# Patient Record
Sex: MALE | Race: ASIAN | Employment: OTHER | URBAN - METROPOLITAN AREA
[De-identification: names, ages, dates, MRNs, and addresses within clinical notes are randomized per-mention and may not be internally consistent; named-entity substitution may affect disease eponyms.]

---

## 2021-05-17 ENCOUNTER — HOSPITAL ENCOUNTER (EMERGENCY)
Facility: HOSPITAL | Age: 86
Discharge: HOME | End: 2021-05-17
Attending: EMERGENCY MEDICINE
Payer: MEDICARE

## 2021-05-17 ENCOUNTER — APPOINTMENT (OUTPATIENT)
Dept: CT IMAGING | Facility: HOSPITAL | Age: 86
End: 2021-05-17
Attending: EMERGENCY MEDICINE
Payer: MEDICARE

## 2021-05-17 VITALS
RESPIRATION RATE: 19 BRPM | WEIGHT: 130 LBS | SYSTOLIC BLOOD PRESSURE: 138 MMHG | BODY MASS INDEX: 20.4 KG/M2 | OXYGEN SATURATION: 99 % | HEART RATE: 61 BPM | TEMPERATURE: 98 F | DIASTOLIC BLOOD PRESSURE: 87 MMHG | HEIGHT: 67 IN

## 2021-05-17 DIAGNOSIS — S09.90XA INJURY OF HEAD, INITIAL ENCOUNTER: Primary | ICD-10-CM

## 2021-05-17 DIAGNOSIS — S01.01XA LACERATION OF SCALP, INITIAL ENCOUNTER: ICD-10-CM

## 2021-05-17 DIAGNOSIS — S06.0X0A CONCUSSION WITHOUT LOSS OF CONSCIOUSNESS, INITIAL ENCOUNTER: ICD-10-CM

## 2021-05-17 PROCEDURE — 84484 ASSAY OF TROPONIN QUANT: CPT | Performed by: EMERGENCY MEDICINE

## 2021-05-17 PROCEDURE — 72125 CT NECK SPINE W/O DYE: CPT | Performed by: EMERGENCY MEDICINE

## 2021-05-17 PROCEDURE — 82962 GLUCOSE BLOOD TEST: CPT

## 2021-05-17 PROCEDURE — 93010 ELECTROCARDIOGRAM REPORT: CPT | Performed by: EMERGENCY MEDICINE

## 2021-05-17 PROCEDURE — 99284 EMERGENCY DEPT VISIT MOD MDM: CPT | Performed by: EMERGENCY MEDICINE

## 2021-05-17 PROCEDURE — 85025 COMPLETE CBC W/AUTO DIFF WBC: CPT | Performed by: EMERGENCY MEDICINE

## 2021-05-17 PROCEDURE — 85730 THROMBOPLASTIN TIME PARTIAL: CPT | Performed by: EMERGENCY MEDICINE

## 2021-05-17 PROCEDURE — 36415 COLL VENOUS BLD VENIPUNCTURE: CPT | Performed by: EMERGENCY MEDICINE

## 2021-05-17 PROCEDURE — 70450 CT HEAD/BRAIN W/O DYE: CPT | Performed by: EMERGENCY MEDICINE

## 2021-05-17 PROCEDURE — 85610 PROTHROMBIN TIME: CPT | Performed by: EMERGENCY MEDICINE

## 2021-05-17 PROCEDURE — 12002 RPR S/N/AX/GEN/TRNK2.6-7.5CM: CPT | Performed by: EMERGENCY MEDICINE

## 2021-05-17 PROCEDURE — 80048 BASIC METABOLIC PNL TOTAL CA: CPT | Performed by: EMERGENCY MEDICINE

## 2021-05-17 PROCEDURE — 93005 ELECTROCARDIOGRAM TRACING: CPT
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
## 2021-05-17 NOTE — ED QUICK NOTES
Patient ambulated around the room with standby assist. Gait is steady. He states he feels well and would like to leave.
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
## 2021-05-26 ENCOUNTER — HOSPITAL ENCOUNTER (EMERGENCY)
Facility: HOSPITAL | Age: 86
Discharge: HOME | End: 2021-05-26
Payer: MEDICARE

## 2021-05-26 VITALS
OXYGEN SATURATION: 98 % | DIASTOLIC BLOOD PRESSURE: 78 MMHG | HEART RATE: 77 BPM | TEMPERATURE: 97 F | SYSTOLIC BLOOD PRESSURE: 124 MMHG | RESPIRATION RATE: 16 BRPM

## 2021-05-26 DIAGNOSIS — Z48.02 ENCOUNTER FOR REMOVAL OF SUTURES: ICD-10-CM

## 2021-05-26 DIAGNOSIS — Z48.02 ENCOUNTER FOR STAPLE REMOVAL: Primary | ICD-10-CM

## 2021-05-26 NOTE — ED INITIAL ASSESSMENT (HPI)
Cintia Huffman arrived through triage with his Wife requesting staple removal (L sided scalp). No c/o pain or drainage. No c/o fever or vomiting.

## 2021-05-26 NOTE — ED PROVIDER NOTES
Patient Seen in: [facility] AND [facility] Emergency Department      History   Patient presents with:  Sut Stap Bri    Stated Complaint: suture removal    HPI/Subjective:   HPI    45-year-old male presents with his wife for removal of suture and staples is normal.   Musculoskeletal:         General: Normal range of motion. Cervical back: Normal range of motion and neck supple. Skin:     General: Skin is warm and dry. Capillary Refill: Capillary refill takes less than 2 seconds.    Neurological:

## 2021-05-26 NOTE — ED QUICK NOTES
Assumed care at time of discharge. No nursing interventions needed. Okay for discharge home per NP. NP evaluation complete. Patient is in no apparent distress.

## 2021-11-29 ENCOUNTER — APPOINTMENT (OUTPATIENT)
Dept: GENERAL RADIOLOGY | Facility: HOSPITAL | Age: 86
DRG: 083 | End: 2021-11-29
Attending: EMERGENCY MEDICINE
Payer: MEDICARE

## 2021-11-29 ENCOUNTER — HOSPITAL ENCOUNTER (INPATIENT)
Facility: HOSPITAL | Age: 86
LOS: 2 days | Discharge: HOME HEALTH | DRG: 083 | End: 2021-12-01
Attending: EMERGENCY MEDICINE | Admitting: INTERNAL MEDICINE
Payer: MEDICARE

## 2021-11-29 ENCOUNTER — APPOINTMENT (OUTPATIENT)
Dept: CT IMAGING | Facility: HOSPITAL | Age: 86
DRG: 083 | End: 2021-11-29
Attending: EMERGENCY MEDICINE
Payer: MEDICARE

## 2021-11-29 DIAGNOSIS — S22.31XA CLOSED FRACTURE OF ONE RIB OF RIGHT SIDE, INITIAL ENCOUNTER: ICD-10-CM

## 2021-11-29 DIAGNOSIS — S06.5X9A ACUTE SUBDURAL HEMATOMA (HCC): Primary | ICD-10-CM

## 2021-11-29 DIAGNOSIS — I60.9 SAH (SUBARACHNOID HEMORRHAGE) (HCC): ICD-10-CM

## 2021-11-29 DIAGNOSIS — S06.4X9A: ICD-10-CM

## 2021-11-29 DIAGNOSIS — S02.40EA CLOSED FRACTURE OF RIGHT ZYGOMATIC ARCH, INITIAL ENCOUNTER (HCC): ICD-10-CM

## 2021-11-29 PROBLEM — S06.5XAA ACUTE SUBDURAL HEMATOMA (HCC): Status: ACTIVE | Noted: 2021-11-29

## 2021-11-29 PROBLEM — R73.9 HYPERGLYCEMIA: Status: ACTIVE | Noted: 2021-11-29

## 2021-11-29 PROBLEM — R79.89 AZOTEMIA: Status: ACTIVE | Noted: 2021-11-29

## 2021-11-29 PROCEDURE — 99222 1ST HOSP IP/OBS MODERATE 55: CPT | Performed by: COLON & RECTAL SURGERY

## 2021-11-29 PROCEDURE — 72040 X-RAY EXAM NECK SPINE 2-3 VW: CPT | Performed by: EMERGENCY MEDICINE

## 2021-11-29 PROCEDURE — 99223 1ST HOSP IP/OBS HIGH 75: CPT | Performed by: STUDENT IN AN ORGANIZED HEALTH CARE EDUCATION/TRAINING PROGRAM

## 2021-11-29 PROCEDURE — 71101 X-RAY EXAM UNILAT RIBS/CHEST: CPT | Performed by: EMERGENCY MEDICINE

## 2021-11-29 PROCEDURE — XW03372 INTRODUCTION OF INACTIVATED COAGULATION FACTOR XA INTO PERIPHERAL VEIN, PERCUTANEOUS APPROACH, NEW TECHNOLOGY GROUP 2: ICD-10-PCS | Performed by: EMERGENCY MEDICINE

## 2021-11-29 PROCEDURE — 99291 CRITICAL CARE FIRST HOUR: CPT | Performed by: NURSE PRACTITIONER

## 2021-11-29 PROCEDURE — 70450 CT HEAD/BRAIN W/O DYE: CPT | Performed by: EMERGENCY MEDICINE

## 2021-11-29 RX ORDER — METOCLOPRAMIDE HYDROCHLORIDE 5 MG/ML
10 INJECTION INTRAMUSCULAR; INTRAVENOUS EVERY 8 HOURS PRN
Status: DISCONTINUED | OUTPATIENT
Start: 2021-11-29 | End: 2021-12-01

## 2021-11-29 RX ORDER — LEVETIRACETAM 500 MG/5ML
250 INJECTION, SOLUTION, CONCENTRATE INTRAVENOUS EVERY 12 HOURS
Status: DISCONTINUED | OUTPATIENT
Start: 2021-11-30 | End: 2021-11-30

## 2021-11-29 RX ORDER — DOCUSATE SODIUM 100 MG/1
100 CAPSULE, LIQUID FILLED ORAL 2 TIMES DAILY PRN
Status: DISCONTINUED | OUTPATIENT
Start: 2021-11-29 | End: 2021-12-01

## 2021-11-29 RX ORDER — ACETAMINOPHEN 325 MG/1
650 TABLET ORAL EVERY 4 HOURS PRN
Status: DISCONTINUED | OUTPATIENT
Start: 2021-11-29 | End: 2021-12-01

## 2021-11-29 RX ORDER — SODIUM CHLORIDE 9 MG/ML
INJECTION, SOLUTION INTRAVENOUS CONTINUOUS
Status: DISCONTINUED | OUTPATIENT
Start: 2021-11-29 | End: 2021-11-30

## 2021-11-29 RX ORDER — SODIUM CHLORIDE 9 MG/ML
INJECTION, SOLUTION INTRAVENOUS CONTINUOUS
Status: DISCONTINUED | OUTPATIENT
Start: 2021-11-29 | End: 2021-11-29

## 2021-11-29 RX ORDER — ONDANSETRON 2 MG/ML
4 INJECTION INTRAMUSCULAR; INTRAVENOUS EVERY 6 HOURS PRN
Status: DISCONTINUED | OUTPATIENT
Start: 2021-11-29 | End: 2021-12-01

## 2021-11-29 RX ORDER — HYDRALAZINE HYDROCHLORIDE 20 MG/ML
10 INJECTION INTRAMUSCULAR; INTRAVENOUS EVERY 2 HOUR PRN
Status: DISCONTINUED | OUTPATIENT
Start: 2021-11-29 | End: 2021-12-01

## 2021-11-29 RX ORDER — LEVETIRACETAM 500 MG/5ML
250 INJECTION, SOLUTION, CONCENTRATE INTRAVENOUS ONCE
Status: COMPLETED | OUTPATIENT
Start: 2021-11-29 | End: 2021-11-29

## 2021-11-29 RX ORDER — HYDROMORPHONE HYDROCHLORIDE 1 MG/ML
0.5 INJECTION, SOLUTION INTRAMUSCULAR; INTRAVENOUS; SUBCUTANEOUS EVERY 30 MIN PRN
Status: ACTIVE | OUTPATIENT
Start: 2021-11-29 | End: 2021-11-30

## 2021-11-29 RX ORDER — LORAZEPAM 2 MG/ML
1 INJECTION INTRAMUSCULAR
Status: DISCONTINUED | OUTPATIENT
Start: 2021-11-29 | End: 2021-12-01

## 2021-11-29 RX ORDER — ONDANSETRON 2 MG/ML
4 INJECTION INTRAMUSCULAR; INTRAVENOUS EVERY 4 HOURS PRN
Status: DISCONTINUED | OUTPATIENT
Start: 2021-11-29 | End: 2021-11-29

## 2021-11-29 RX ORDER — ACETAMINOPHEN 650 MG/1
650 SUPPOSITORY RECTAL EVERY 4 HOURS PRN
Status: DISCONTINUED | OUTPATIENT
Start: 2021-11-29 | End: 2021-12-01

## 2021-11-29 RX ORDER — HYDROMORPHONE HYDROCHLORIDE 1 MG/ML
0.5 INJECTION, SOLUTION INTRAMUSCULAR; INTRAVENOUS; SUBCUTANEOUS EVERY 30 MIN PRN
Status: DISCONTINUED | OUTPATIENT
Start: 2021-11-29 | End: 2021-12-01

## 2021-11-29 RX ORDER — LABETALOL HYDROCHLORIDE 5 MG/ML
10 INJECTION, SOLUTION INTRAVENOUS EVERY 10 MIN PRN
Status: DISCONTINUED | OUTPATIENT
Start: 2021-11-29 | End: 2021-12-01

## 2021-11-30 ENCOUNTER — APPOINTMENT (OUTPATIENT)
Dept: CT IMAGING | Facility: HOSPITAL | Age: 86
DRG: 083 | End: 2021-11-30
Attending: NURSE PRACTITIONER
Payer: MEDICARE

## 2021-11-30 PROCEDURE — 70450 CT HEAD/BRAIN W/O DYE: CPT | Performed by: NURSE PRACTITIONER

## 2021-11-30 PROCEDURE — 99221 1ST HOSP IP/OBS SF/LOW 40: CPT | Performed by: NEUROLOGICAL SURGERY

## 2021-11-30 PROCEDURE — 99232 SBSQ HOSP IP/OBS MODERATE 35: CPT | Performed by: HOSPITALIST

## 2021-11-30 RX ORDER — METOPROLOL SUCCINATE 25 MG/1
25 TABLET, EXTENDED RELEASE ORAL DAILY
COMMUNITY
Start: 2021-10-08

## 2021-11-30 RX ORDER — POTASSIUM CHLORIDE 14.9 MG/ML
20 INJECTION INTRAVENOUS ONCE
Status: COMPLETED | OUTPATIENT
Start: 2021-11-30 | End: 2021-11-30

## 2021-11-30 RX ORDER — LEVETIRACETAM 250 MG/1
250 TABLET ORAL 2 TIMES DAILY
Status: DISCONTINUED | OUTPATIENT
Start: 2021-11-30 | End: 2021-12-01

## 2021-11-30 RX ORDER — METOPROLOL SUCCINATE 25 MG/1
25 TABLET, EXTENDED RELEASE ORAL
Status: DISCONTINUED | OUTPATIENT
Start: 2021-11-30 | End: 2021-12-01

## 2021-11-30 RX ORDER — APIXABAN 2.5 MG/1
2.5 TABLET, FILM COATED ORAL 2 TIMES DAILY
COMMUNITY
Start: 2021-08-05 | End: 2021-12-01

## 2021-11-30 NOTE — PHYSICAL THERAPY NOTE
PHYSICAL THERAPY EVALUATION - INPATIENT     Room Number: 2022/6691-P  Evaluation Date: 11/30/2021  Type of Evaluation: Initial  Physician Order: PT Eval and Treat    Presenting Problem: Acute intracranial hemorrhage, skull fracture, R lateral 8th rib fx anticoagulation      Past Medical History  Past Medical History:   Diagnosis Date   • Arrhythmia     afib   • Essential hypertension    • Pacemaker        Past Surgical History  Past Surgical History:   Procedure Laterality Date   • CARDIAC PACEMAKER PLACE blankets)?: None   Patient Difficulty: Sitting down on and standing up from a chair with arms (e.g., wheelchair, bedside commode, etc.): None   Patient Difficulty: Moving from lying on back to sitting on the side of the bed?: None   How much help from anot leg                                                                2        Exercise/Education Provided:  Functional activity tolerated  Gait training  Transfer training    Patient End of Session: Up in chair;Needs met;Call light within reach;RN aware of s mobility; Endurance; Energy conservation;Patient education; Family education;Gait training;Neuromuscular re-educate;Strengthening;Transfer training;Stair training;Balance training  Rehab Potential : Fair  Frequency (Obs): 3-5x/week  Number of Visits to Meet E

## 2021-11-30 NOTE — PLAN OF CARE
Assumed care of this patient at 0730. Neuro checks every hour. Occasionally disoriented to date, otherwise neurologically intact. Bruises on right cheek, declining headache at this time. Please see complex neuro-flow sheet for complete assessment.  Room air activity based on assessment  Outcome: Progressing  Goal: Achieves maximal functionality and self care  Description: INTERVENTIONS  - Monitor swallowing and airway patency with patient fatigue and changes in neurological status  - Encourage and assist nisreen

## 2021-11-30 NOTE — PROGRESS NOTES
Received pt. From ED at 2330. Pt. A & O x3-4. VSS - afib per monitor. DELGADO. Neuro checks Q1H, intact, very [place], see neuro flowsheets. Voiding. Repeat CT at 0200. Questions and concerns addressed w/ Pts. Spouse and daughter. Will continue to monitor closely.

## 2021-11-30 NOTE — H&P
[facility]  History and Physical     Alejandro Tashia Patient Status:  Inpatient    1931 MRN [id number]   [facility] 6NE-A Attending Paloma Sagastume MD   Hosp Day # 0 PCP Kaylee Lara     Chief Complaint: FALL     History of or deformity. Abdomen: Soft, nontender, nondistended. Positive bowel sounds. No rebound, guarding or organomegaly. Neurologic: No focal neurological deficits. CNII-XII grossly intact. Musculoskeletal: Moves all extremities.   Extremities: No edema or cy MD  71/23/9983          **Certification      PHYSICIAN Certification of Need for Inpatient Hospitalization - Initial Certification    Patient will require inpatient services that will reasonably be expected to span two midnight's based on the clinical docu

## 2021-11-30 NOTE — CONSULTS
[facility]  Neurosurgery Consult    Donta Laura Patient Status:  Inpatient    1931 MRN [id number]   [facility] 6NE-A Attending Yanira Montaño MD   Hosp Day # 1 PCP Jimmie Vargas FOR CONSULTATION:  SDH    HISTORY O Intravenous, Q30 Min PRN  0.9% NaCl infusion, , Intravenous, Continuous  Labetalol HCl (TRANDATE) injection 10 mg, 10 mg, Intravenous, Q10 Min PRN  hydrALAzine HCl (APRESOLINE) injection 10 mg, 10 mg, Intravenous, Q2H PRN  acetaminophen (TYLENOL) tab 650 m 11/29/2021    PTT 29.8 11/29/2021    INR 1.04 11/29/2021    PTP 13.8 11/29/2021    MG 2.1 11/30/2021    PGLU 101 11/30/2021       IMAGING:  CT 11/30      FINDINGS:         POSITIVE FOR ACUTE INTRACRANIAL HEMORRHAGE INCLUDING SUBDURAL BLOOD, EPIDURAL BLOOD, 1.  Positive for ACUTE INTRACRANIAL HEMORRHAGE with 4 types of hemorrhages represented including epidural, subdural, subarachnoid and parenchymal.  The epidural hematoma is in the right temporal region immediately adjacent to a nondisplaced temporal bone performed 11/29/2021, development of a left subdural hematoma with interval decrease in size of right subdural and epidural hematomas as detailed above.  Persistent subarachnoid   hemorrhage.       2.  Findings most consistent with chronic small vessel isch

## 2021-11-30 NOTE — CONSULTS
[address]   NEUROCRITICAL CARE   CONSULT NOTE    Admission date: 11/29/2021  Reason for Consult: TBI  Chief Complaint: Patient presents with:  Trauma  ________________________________________________________________    History     Hist orthopnea, PND, LE swelling   RESP: cough, sputum, dyspnea, hemoptysis, pleuritic pain   GI: abdominal pain, nausea, vomiting, hematemesis, diarrhea, constipation, BRBPR, melena   : hematuria, dysuria, frequency, urgency  MSKL: pain, muscle weakness, katlin 20*   CREATSERUM 0.96 0.75     Recent Labs   Lab 11/29/21 1935 11/30/21  0433   WBC 12.0* 9.4   HGB 14.9 13.0   .0 148.0*     Recent Labs   Lab 11/29/21 1937   ALT 25   AST 25     Recent Labs   Lab 11/30/21 0433   MG 2.1       Radiology:    CT BR fracture or subluxation. Vascular calcifications in the soft tissues. .   Dictated by (CST): aRdha Manzo MD on 11/29/2021 at 9:23 PM     Finalized by (CST): Radha Manzo MD on 11/29/2021 at 9:25 PM       XR RIBS WITH CHEST (3 VIEWS), RIGHT  (CPT=71101 making, and/or extensive discussions with the patient, family, and clinical staff.     [facility], [address] [facility] [place]

## 2021-11-30 NOTE — ED INITIAL ASSESSMENT (HPI)
Pt fell off 3 foot ladder hitting right side of head, pt admits to loc. Pt on eliquis. C/o dizziness.

## 2021-11-30 NOTE — PROGRESS NOTES
[facility]  Neurocritical Care APRN Progress Note    NAME: Govind Hollingsworth - ROOM: 9386/6484-X - MRN: [id number] - Age: 80year old - WJU:8/39/8340    History Of Present Illness:  Govind Hollingsworth is a 80year old male with PMHx sig midline, no carotid bruits, adenopathy, or JVD  Lungs: Clear to auscultation bilaterally, respirations unlabored  Heart: Regular rate and rhythm, S1 and S2 normal, no murmur, rub or gallop  Abdomen: Soft, non-tender, bowel sounds active all four quadrants, SPINE (TRAUMA) PORTABLE  (CPT=72040)    Result Date: 11/29/2021  CONCLUSION:  Hypertrophic degenerative changes cervical spine. No acute fracture or subluxation. Vascular calcifications in the soft tissues. .   Dictated by (CST): Joy Mendez MD on 11/2 at this time d/t evidence of bleeding  -SCD    Dispo:   -Full code    Plan of care discussed with Dr. Nasim Dan. A total of 35 minutes of critical care time (exclusive of billable procedures) was administered.  This involved d

## 2021-11-30 NOTE — PROGRESS NOTES
[facility]     Hospitalist Progress Note     Trever Olivo Patient Status:  Inpatient    1931 MRN [id number]   [facility] 6NE-A Attending Mauricio Johnson MD   Hosp Day # 1 PCP Phoebe Pennington     Chief Complaint: fall    Jesus Courser data reviewed in Epic. Medications:   • metoprolol succinate  25 mg Oral Daily Beta Blocker   • levETIRAcetam  250 mg Oral BID       Assessment & Plan:      1. Fall resulting in CNS bleed  2.  Acute intracranial hemorrhage- epidural, subdural, subarachno

## 2021-11-30 NOTE — CONSULTS
[facility]  Report of Consultation    Vo Letters Patient Status:  Inpatient    1931 MRN [id number]   [facility] 6NE-A Attending Aaron Fong MD   Hosp Day # 0 PCP Yamilex Delvalle     Requesting Physician:  Dr. Lawyer Tavera coag cact Xa inactivated-zhzo (ANDEXXA-andexanet javier) 400 mg in 40 mL IVPB, 400 mg, Intravenous, Once **FOLLOWED BY** coag cact Xa inactivated-zhzo (ANDEXXA-andexanet javier) 480 mg in 48 mL infusion, 480 mg, Intravenous, Once  •  HYDROmorphone HCl (DILAUDI Musculoskeletal: Normal range of motion. Neurological: He is alert and oriented to person, place, and time. No cranial nerve deficit or motor deficit. Skin: Skin is warm.        Laboratory Data:  Recent Labs   Lab 11/29/21 1935   RBC 4.41   HGB 14.9 lateral 8th rib fracture. No pneumothorax. COPD with scattered calcified granulomata. Mild cardiomegaly. Small bilateral pleural effusions.     Dictated by (CST): Suman Weiss MD on 11/29/2021 at 9:34 PM     Finalized by (CST): Suman Weiss MD on 11

## 2021-11-30 NOTE — SLP NOTE
ADULT SWALLOWING EVALUATION    ASSESSMENT    ASSESSMENT/OVERALL IMPRESSION:  Patient seen for swallowing evaluation as he was admitted post fall. He was found to have sustained R intracranial hemorrhage, right zygomatic and temporal bone fracture.   He den Chronic anticoagulation      Past Medical History  Past Medical History:   Diagnosis Date   • Arrhythmia     afib   • Essential hypertension    • Pacemaker        Prior Living Situation: Home with spouse  Diet Prior to Admission: Regular; Thin liquids  Prec consistent with possible esophageal involvement            GOALS  Goal #1 Patient will participate in cognitive communication assessment per protocol.   In Progress     FOLLOW UP  Treatment Plan/Recommendations: Communication evaluation  Number of Visits to

## 2021-11-30 NOTE — ED PROVIDER NOTES
Patient Seen in: [facility] Emergency Department      History   Patient presents with:  Trauma    Stated Complaint: fell off a ladder approx 3 feet, more confused and hit head, +LOC    Subjective:   HPI    Patient is a 25-year-old male who around 3 step-off  CARDIOVASCULAR: + S1-S2, regular rate and rhythm, no murmurs. BACK: No CVA tenderness, no midline bony tenderness. ABDOMEN: + Bowel sounds, soft, nontender, nondistended. No rebound, no guarding, no hepatosplenomegaly.   EXTREMITIES: Full range Final result                 Please view results for these tests on the individual orders. ABORH (BLOOD TYPE)   ANTIBODY SCREEN     EKG    Rate, intervals and axes as noted on EKG Report.   Rate: 88  Rhythm: Atrial Fibrillation  Reading: Atrial fibrill decision making, and/or extensive discussions with the patient, family, and clinical staff.       Admission disposition: 11/29/2021 10:29 PM                                  Disposition and Plan     Clinical Impression:  Acute subdural hematoma ([address]  (prim

## 2021-11-30 NOTE — OCCUPATIONAL THERAPY NOTE
OCCUPATIONAL THERAPY EVALUATION - INPATIENT     Room Number: 1981/3674-P  Evaluation Date: 11/30/2021  Type of Evaluation: Initial  Presenting Problem: acute subdural hematoma    Physician Order: IP Consult to Occupational Therapy  Reason for Therapy: ADL/ tested  LB dressing: SUPV for donning socks  Toileting: not tested    Functional Mobility:  Supine to Sit : Not tested  Sit to Stand: Contact guard assist  Sit to supine: not tested  Chair transfer: MIN A for balance and aligning self to chair  Toilet/comm past, as well as other \"fainting\" events. Pt's spouse volunteers at local [facility] and reports being busy with tasks throughout the day. SUBJECTIVE   \"This is an interesting assessment you are doing on human interaction. \"    PAIN ASSESSMENT follow up)                  PLAN  OT Treatment Plan: Balance activities; Energy conservation/work simplification techniques;ADL training;IADL training;Visual perceptual training;Functional transfer training;UE strengthening/ROM; Endurance training;Cognitive

## 2021-11-30 NOTE — PROGRESS NOTES
[facility]  Progress Note    Sylvain Estrada Patient Status:  Inpatient    1931 MRN [id number]   [facility] 6NE-A Attending Burgess Katlin MD   [facility] Day # 1 PCP Verenice Olivas     Subjective:   The patient is sleeping comfortably 11/30/2021    NITRITE Negative 11/30/2021    LEUUR Negative 11/30/2021         Assessment:  Patient Active Problem List:     Azotemia     Hyperglycemia     Epidural hemorrhage with loss of consciousness (HCC)     Epidural hemorrhage with loss of consciousn

## 2021-12-01 ENCOUNTER — APPOINTMENT (OUTPATIENT)
Dept: CT IMAGING | Facility: HOSPITAL | Age: 86
DRG: 083 | End: 2021-12-01
Attending: NURSE PRACTITIONER
Payer: MEDICARE

## 2021-12-01 VITALS
RESPIRATION RATE: 18 BRPM | BODY MASS INDEX: 19.73 KG/M2 | WEIGHT: 125.69 LBS | SYSTOLIC BLOOD PRESSURE: 127 MMHG | HEART RATE: 88 BPM | OXYGEN SATURATION: 99 % | DIASTOLIC BLOOD PRESSURE: 73 MMHG | HEIGHT: 67 IN | TEMPERATURE: 98 F

## 2021-12-01 PROBLEM — S06.5XAA SDH (SUBDURAL HEMATOMA) (HCC): Status: ACTIVE | Noted: 2021-11-29

## 2021-12-01 PROBLEM — S06.5X9A SDH (SUBDURAL HEMATOMA) (HCC): Status: ACTIVE | Noted: 2021-11-29

## 2021-12-01 PROCEDURE — 99291 CRITICAL CARE FIRST HOUR: CPT | Performed by: OTHER

## 2021-12-01 PROCEDURE — 70450 CT HEAD/BRAIN W/O DYE: CPT | Performed by: NURSE PRACTITIONER

## 2021-12-01 PROCEDURE — 99239 HOSP IP/OBS DSCHRG MGMT >30: CPT | Performed by: HOSPITALIST

## 2021-12-01 RX ORDER — POLYETHYLENE GLYCOL 3350 17 G/17G
17 POWDER, FOR SOLUTION ORAL DAILY
Status: DISCONTINUED | OUTPATIENT
Start: 2021-12-01 | End: 2021-12-01

## 2021-12-01 RX ORDER — LEVETIRACETAM 250 MG/1
250 TABLET ORAL 2 TIMES DAILY
Qty: 14 TABLET | Refills: 0 | Status: SHIPPED | OUTPATIENT
Start: 2021-12-01

## 2021-12-01 NOTE — PROGRESS NOTES
[facility]  Neurosurgery Progress Note    Nehalcourtney Daniel Patient Status:  Inpatient    1931 MRN [id number]   [facility] 6NE-A Attending Quique Blackwell [facility] Day # 2 PCP Jeannette Delgado     Chief Complaint:  Patient pre the subdural hematoma posteriorly and laterally in the left cerebrum presently measures 3 mm, previously 5 mm.  There is minimal effacement of the sulci posteriorly involving the   parietal and occipital lobes slightly.  The basal cisterns are patent.  The

## 2021-12-01 NOTE — PROGRESS NOTES
[facility]   NEUROCRITICAL CARE   PROGRESS NOTE    Admission date: 11/29/2021  Reason for Consult: SDH  Chief Complaint: Patient presents with:  Trauma  ________________________________________________________________    SUBJECTIVE 11/30/21  0433 12/01/21  0412   MG 2.1 2.1       Scheduled Meds:  • metoprolol succinate  25 mg Oral Daily Beta Blocker   • levETIRAcetam  250 mg Oral BID     Continuous Infusions:  • niCARdipine       PRN Meds:HYDROmorphone HCl, Labetalol HCl, hydrALAzine Gen diet          - IVF: -          - Electrolytes: Replete per protocol         - Code Status: FULL     Dispo: CNICU, dc pending PT/OT and nsg clearance      Greater than 38 minutes of critical care time (exclusive of billable procedures) was administered

## 2021-12-01 NOTE — PHYSICAL THERAPY NOTE
PHYSICAL THERAPY TREATMENT NOTE - INPATIENT    Room Number: 2029/1347-V     Session: 1   Number of Visits to Meet Established Goals: 5    Presenting Problem: Acute intracranial hemorrhage, skull fracture, R lateral 8th rib fx  Co-Morbidities : A-fib on El need...    Help from Another: Moving to and from a bed to a chair (including a wheelchair)?: A Little   Help from Another: Need to walk in hospital room?: A Little   Help from Another: Climbing 3-5 steps with a railing?: A Little     AM-PAC Score:  Raw Scor category that applies. (3)  Normal: Able to smoothly change walking speed without loss of balance or gait deviation. Shows a significant difference in walking speed between normal, fast and slow speeds.    (2)  Mild Impairment: Is able to change speed but Up in chair;Needs met;Call light within reach;RN aware of session/findings; All patient questions and concerns addressed; Alarm set    ASSESSMENT   The pt exhibits improved balance and gait as he is able to ambulate with increased gait speed and does not exh

## 2021-12-01 NOTE — CM/SW NOTE
CM spoke with Castro Flaherty (spouse) regarding discharge home today. HHC choice given and wife chose [facility]. Agency notified of today's discharge. Wife on her way to hospital to get discharge paperwork and transport patient home.  Castro Flaherty states sh

## 2021-12-01 NOTE — PLAN OF CARE
Assumed care of pt at approximately 1930. A fib on tele, RA. Neuro checks Q2 hours, neuros appear intact, see flowsheet for full assessment.  Pt attempted to climb out of bed multiple times throughout the night and is frustrated with staying in the hospital

## 2021-12-01 NOTE — PLAN OF CARE
Assumed care of this patient at 0730. Neuro checks every 2 hours. Patient impulsive, but is intact. Following commands and moving all extremities. CT head stable. Room air. Afebrile. Controlled atrial-fibrillation. SBP WNL. Tolerating diet well.  Voids in t pressure (other measures as available)  - Encourage oral intake as appropriate  - Instruct patient on fluid and nutrition restrictions as appropriate  Outcome: Progressing     Problem: NEUROLOGICAL - ADULT  Goal: Achieves stable or improved neurological st

## 2021-12-02 NOTE — PAYOR COMM NOTE
--------------  DISCHARGE REVIEW    Payor: Rodney Brooks  Subscriber #:  [id number]  Authorization Number: [id number]    Admit date: 11/29/21  Admit time:  11:03 PM  Discharge Date: 12/1/2021  2:58 PM     Admitting Physician: Jimi Barrera MD  Attending cleared by neurosurgery    12/1 NEUROLOGY NOTE  24 Hour Significant Events: Thrombocytopenia stable. Repeat CT with decreased size of BL SDHs.  Worked with PT/OT.      OBJECTIVE   VITAL SIGNS:   Temp:  [97.8 °F (36.6 °C)-98.6 °F (37 °C)] 98.6 °F (37 °C)  Pu

## 2021-12-02 NOTE — PAYOR COMM NOTE
--------------  ADMISSION REVIEW     Payor: No Zaman  Subscriber #:  [id number]  Authorization Number: [id number]    Admit date: 11/29/21  Admit time: 11:03 PM       REVIEW DOCUMENTATION:     ED Provider Notes      ED Provider Notes signed by Mavis Jin P[address]   Temp 96.8 °F (36 °C) (Temporal)   Resp 24   Ht 170.2 cm (5' 7\")   Wt 59 kg   SpO2 100%   BMI 20.36 kg/m²         Physical Exam  GENERAL: Patient resting comfortably on the cart in no acute distress.   HEENT: Extraocular muscles intact, pupils eq CBC W/ DIFFERENTIAL[[id number]]          Abnormal            Final result                 Please view results for these tests on the individual orders. TYPE AND SCREEN    Narrative: The following orders were created for panel order Type and screen. needed at this time. I did speak with the neuro intensivist who agreed with Burke Lara as well as recommended Keppra to 250 IV twice daily. [facility]  I did speak with trauma surgeon Dr. Sary Garza.   Patient had numerous reevaluati Inpatient    1931 MRN [id number]   [facility] 6NE-A Attending Stefanie Bowman MD   Hosp Day # 0 PCP Kieran Vilchis     Chief Complaint: FALL     History of Present Illness: Dolores Raymundo is a 80year old male with h/o afib on eliquis, rebound, guarding or organomegaly. Neurologic: No focal neurological deficits. CNII-XII grossly intact. Musculoskeletal: Moves all extremities. Extremities: No edema or cyanosis. Integument: No rashes or lesions.    Psychiatric: Appropriate mood and aff Initial Certification    Patient will require inpatient services that will reasonably be expected to span two midnight's based on the clinical documentation in H+P.    Based on patients current state of illness, I anticipate that, after discharge, patient w 2200 — 112 20 111/73 98 % — — — CR    11/30/21 2100 — 112 24 110/79 98 % — — — CR    11/30/21 2000 97.8 °F (36.6 °C) 95 22 110/73 99 % — None (Room air) — CR    11/30/21 1900 — 92 20 114/87 100 % — None (Room air) — EF    11/30/21 1800 — 73 21 118/79 100 % to the ED where a CT brain showed acute R SDH with tentorial SDH, R temporal epidural hematoma, trace traumatic SAH, and small R temporal IPH. He is noted to have R temporal skull fx. Neurosurgery was consulted for this.   He received Andexxa for reversa 11/30/2021  CONCLUSION:  1. When compared to most recent noncontrast CT of the brain performed 11/29/2021, development of a left subdural hematoma with interval decrease in size of right subdural and epidural hematomas as detailed above.   Persistent subara fracture     Plan:  1. Neurology and neurosurgery on consult. Appreciate recommendations. 2. No indication for emergent surgical intervention  3. Continue regular diet  4. Encourage incentive spirometry   5.  DVT prophylaxis- SCDS only    11/30 HOSPITALIST

## 2021-12-04 ENCOUNTER — HOSPITAL ENCOUNTER (INPATIENT)
Facility: HOSPITAL | Age: 86
LOS: 2 days | Discharge: HOME HEALTH | DRG: 087 | End: 2021-12-06
Attending: EMERGENCY MEDICINE | Admitting: HOSPITALIST
Payer: MEDICARE

## 2021-12-04 ENCOUNTER — APPOINTMENT (OUTPATIENT)
Dept: CT IMAGING | Facility: HOSPITAL | Age: 86
DRG: 087 | End: 2021-12-04
Attending: EMERGENCY MEDICINE
Payer: MEDICARE

## 2021-12-04 DIAGNOSIS — S06.5X9A ACUTE SUBDURAL HEMATOMA (HCC): ICD-10-CM

## 2021-12-04 DIAGNOSIS — S06.5X9A SUBACUTE SUBDURAL HEMATOMA (HCC): Primary | ICD-10-CM

## 2021-12-04 PROBLEM — S06.5XAA ACUTE SUBDURAL HEMATOMA (HCC): Status: ACTIVE | Noted: 2021-12-04

## 2021-12-04 PROBLEM — S06.5XAA SUBACUTE SUBDURAL HEMATOMA (HCC): Status: ACTIVE | Noted: 2021-12-04

## 2021-12-04 PROCEDURE — 70450 CT HEAD/BRAIN W/O DYE: CPT | Performed by: EMERGENCY MEDICINE

## 2021-12-04 PROCEDURE — 99223 1ST HOSP IP/OBS HIGH 75: CPT | Performed by: INTERNAL MEDICINE

## 2021-12-04 RX ORDER — METOPROLOL SUCCINATE 25 MG/1
25 TABLET, EXTENDED RELEASE ORAL DAILY
Status: DISCONTINUED | OUTPATIENT
Start: 2021-12-05 | End: 2021-12-06

## 2021-12-04 RX ORDER — ACETAMINOPHEN 325 MG/1
650 TABLET ORAL EVERY 4 HOURS PRN
Status: DISCONTINUED | OUTPATIENT
Start: 2021-12-04 | End: 2021-12-06

## 2021-12-04 RX ORDER — ACETAMINOPHEN 650 MG/1
650 SUPPOSITORY RECTAL EVERY 4 HOURS PRN
Status: DISCONTINUED | OUTPATIENT
Start: 2021-12-04 | End: 2021-12-06

## 2021-12-04 RX ORDER — ACETAMINOPHEN 325 MG/1
650 TABLET ORAL EVERY 6 HOURS PRN
Status: DISCONTINUED | OUTPATIENT
Start: 2021-12-04 | End: 2021-12-06

## 2021-12-04 RX ORDER — LEVETIRACETAM 250 MG/1
250 TABLET ORAL ONCE
Status: COMPLETED | OUTPATIENT
Start: 2021-12-04 | End: 2021-12-04

## 2021-12-04 RX ORDER — DOCUSATE SODIUM 100 MG/1
100 CAPSULE, LIQUID FILLED ORAL 2 TIMES DAILY PRN
Status: DISCONTINUED | OUTPATIENT
Start: 2021-12-04 | End: 2021-12-06

## 2021-12-04 RX ORDER — LEVETIRACETAM 250 MG/1
250 TABLET ORAL 2 TIMES DAILY
Status: DISCONTINUED | OUTPATIENT
Start: 2021-12-05 | End: 2021-12-06

## 2021-12-04 RX ORDER — SODIUM CHLORIDE 9 MG/ML
INJECTION, SOLUTION INTRAVENOUS CONTINUOUS
Status: DISCONTINUED | OUTPATIENT
Start: 2021-12-04 | End: 2021-12-05

## 2021-12-04 RX ORDER — HYDRALAZINE HYDROCHLORIDE 20 MG/ML
10 INJECTION INTRAMUSCULAR; INTRAVENOUS EVERY 2 HOUR PRN
Status: DISCONTINUED | OUTPATIENT
Start: 2021-12-04 | End: 2021-12-06

## 2021-12-04 RX ORDER — LORAZEPAM 2 MG/ML
1 INJECTION INTRAMUSCULAR
Status: DISCONTINUED | OUTPATIENT
Start: 2021-12-04 | End: 2021-12-06

## 2021-12-04 RX ORDER — ONDANSETRON 2 MG/ML
4 INJECTION INTRAMUSCULAR; INTRAVENOUS EVERY 6 HOURS PRN
Status: DISCONTINUED | OUTPATIENT
Start: 2021-12-04 | End: 2021-12-06

## 2021-12-04 RX ORDER — LABETALOL HYDROCHLORIDE 5 MG/ML
10 INJECTION, SOLUTION INTRAVENOUS EVERY 10 MIN PRN
Status: DISCONTINUED | OUTPATIENT
Start: 2021-12-04 | End: 2021-12-06

## 2021-12-04 RX ORDER — METOCLOPRAMIDE HYDROCHLORIDE 5 MG/ML
10 INJECTION INTRAMUSCULAR; INTRAVENOUS EVERY 8 HOURS PRN
Status: DISCONTINUED | OUTPATIENT
Start: 2021-12-04 | End: 2021-12-06

## 2021-12-04 NOTE — ED INITIAL ASSESSMENT (HPI)
Patient fell on Monday. Patient was seen at that time at [place] and diagnosed with brain bleed. Patient brought here today after acting confused about a half hour ago. Patient not answering questions directly, delayed responses.  Patient has uncoordinated

## 2021-12-05 PROCEDURE — 99233 SBSQ HOSP IP/OBS HIGH 50: CPT | Performed by: HOSPITALIST

## 2021-12-05 RX ORDER — HYDROCODONE BITARTRATE AND ACETAMINOPHEN 5; 325 MG/1; MG/1
1 TABLET ORAL EVERY 6 HOURS PRN
Status: DISCONTINUED | OUTPATIENT
Start: 2021-12-05 | End: 2021-12-06

## 2021-12-05 NOTE — H&P
[address] Andrews Patient Status:  Emergency    1931  [address] [facility] [id number]   Location  Attending Thomas Palm MD     PCP Denise Kramer         DATE OF ADMISSION: 21    CHIEF CO file       SOCIAL HISTORY  Social History    Socioeconomic History      Marital status:     Tobacco Use      Smoking status: Former Smoker      Smokeless tobacco: Never Used    Vaping Use      Vaping Use: Never used    Substance and Sexual Activity chronic appearing subdural hematoma at the high right occipital parietal lobe where it measures about 5.9 mm in transverse dimension. 3. No well-defined acute area of parenchymal infarction or acute intraparenchymal hemorrhage is noted.   Recommend short-t [facility])  -Repeat CT imaging describing multiple acute subdural hematomas  -CT without mass-effect or midline shift.  -Concern for cause of change in mental status  -Start Keppra  -Holding all anticoagulants  -Neurosurgery consulted, appreciate recommendation

## 2021-12-05 NOTE — ED QUICK NOTES
Bedside dysphagia screen done. Pt able to tolerate liquids with out any evidence of possible aspiration. [place] ([facility]) sandwich provided. Pt complaining of headache at this time. Neuro status at baseline. Awaiting bed availability.

## 2021-12-05 NOTE — PLAN OF CARE
Pt received from ED around 2300 with daughter present. Daughter able to aid in admission questions, pt sleeping soundly bedside upon intial assessment. Pt hard of hearing with bilateral hearing aids present.  Pt woken up easily by voice, increased volume to

## 2021-12-05 NOTE — SLP NOTE
ADULT SWALLOWING EVALUATION    ASSESSMENT    ASSESSMENT/OVERALL IMPRESSION:  PPE REQUIRED. THIS SLP WORE GLOVES AND DROPLET MASK. HANDS SANITIZED/WASHED UPON ENTRANCE/EXIT. SLP BSSE orders received and acknowledged.  A swallow evaluation warranted after dysfunction. Recommend a regular solid diet and thin liquids with strict adherence to safe swallowing compensatory strategies. Results and recommendations reviewed with RN. SLP collaborated with RN for diet orders.  Diet recommendations and swallowing preca lobe have increased since previous study. The small subdural hematoma at the right frontal   lobe has also increased slightly since previous study.   The more chronic subdural hematoma at the posterior right occipital parietal lobe has become more low-dens compensatory strategies as outlined by  BSSE (clinical evaluation) including Slow rate, Small bites, Small sips, Alternate liquids/solids, Upright 90 degrees with minimal assistance 90 % of the time across 1 sessions.     In Progress     FOLLOW UP  Treatmen

## 2021-12-05 NOTE — ED QUICK NOTES
Pt requesting home seizure medication. Confirmed on home med rec. Awaiting keppra from pharmacy. Daughter upset and wait time until available bed in ccu.  Continues to come out of room asking how much longer and why we are unable to transfer him immediately

## 2021-12-05 NOTE — PROGRESS NOTES
[facility] HOSP - [facility]    Progress Note    Katerina Palm Patient Status:  Inpatient    1931 MRN [id number]   Location [facility] 2W/SW Attending Diogo Granados MD   Hosp Day # 1 PCP [facility]       Subjective:   Katerina Palm i 106 (H) 12/05/2021    CA 7.7 (L) 12/05/2021    ALB 2.6 (L) 12/05/2021    ALKPHO 90 12/05/2021    BILT 1.7 12/05/2021    TP 5.6 (L) 12/05/2021    AST 13 (L) 12/05/2021    ALT 21 12/05/2021    PTT 33.1 12/04/2021    INR 1.20 12/04/2021    MG 2.1 12/01/2021 and may have increased slightly in overall transverse dimension. Subdural hematoma at the posterior medial right occipital lobe is about the same.   Subdural hematomas at the right and left transverse sinus regions appear to have increased slightly in size 4:54 PM     Finalized by (CST): Marilia Saavedra MD on 12/04/2021 at 5:18 PM          EKG 12 Lead    Result Date: 12/4/2021  ECG Report  Interpretation  --------------------------     Assessment and Plan:   Chava Darden is a 80year oldmale with hx significant

## 2021-12-05 NOTE — ED PROVIDER NOTES
Patient Seen in: [facility] AND [facility] Emergency Department      History   Patient presents with:  Head Neck Injury  Fall    Stated Complaint: head injury    Subjective:   HPI    80year old male with past medical history of atrial fibrillation previously on (36.7 °C) (Temporal)   Resp (!) 27   Ht 170.2 cm (5' 7\")   Wt 59 kg   SpO2 96%   BMI 20.36 kg/m²         Physical Exam  Vitals and nursing note reviewed. Constitutional:       General: He is not in acute distress. Appearance: He is well-developed.  H PANEL (8) - Abnormal; Notable for the following components:       Result Value    Glucose 120 (*)     Sodium 135 (*)     BUN 20 (*)     BUN/CREA Ratio 22.5 (*)     All other components within normal limits   PROTHROMBIN TIME (PT) - Abnormal; Notable for th subdural space and in the right temporal lobe have increased since previous study. The small subdural hematoma at the right frontal lobe has also increased slightly since previous study.   The more chronic subdural hematoma at the posterior right occipital change in the periventricular white matter bilaterally. 5. No well-defined skull fracture or subcutaneous hematoma. 6. Results were called by Dr. Kennedi Flores to the emergency room and discussed with Dr. Carri Bahena at approximately 5 14 p.m. on  12/4/2021.      Dictate pm    Follow-up:  No follow-up provider specified. We recommend that you schedule follow up care with a primary care provider within the next three months to obtain basic health screening including reassessment of your blood pressure.       Medications Pre

## 2021-12-05 NOTE — PHYSICAL THERAPY NOTE
Orders received and chart reviewed. Patient recently discharged from [facility] after fall and multiple brain bleeds. Patient's imaging shows bleeds are increasing in size. Will hold for neurosurgery consult and follow up on 12/6 as patient is appropriate.

## 2021-12-05 NOTE — OCCUPATIONAL THERAPY NOTE
OT orders rcvd; patient recently DC from [facility] after a fall and multiple brain breeds; updated imaging shows bleeds are increasing in size from last study.  I will hold for neurosurgery consult at this time and complete evaluation once activity gu

## 2021-12-05 NOTE — ED QUICK NOTES
Report called and given to [facility] CCU RN. Awaiting bed to be cleaned. Family at bedside updated.

## 2021-12-05 NOTE — ED QUICK NOTES
Orders for admission, patient is aware of plan and ready to go upstairs. Any questions, please call ED JAY alvarado at extension [phone].      Type of COVID test sent:rapid negative        LOC at time of transport: a/o x2, confused

## 2021-12-06 ENCOUNTER — TELEPHONE (OUTPATIENT)
Dept: NEUROLOGY | Facility: CLINIC | Age: 86
End: 2021-12-06

## 2021-12-06 ENCOUNTER — APPOINTMENT (OUTPATIENT)
Dept: CT IMAGING | Facility: HOSPITAL | Age: 86
DRG: 087 | End: 2021-12-06
Attending: NEUROLOGICAL SURGERY
Payer: MEDICARE

## 2021-12-06 VITALS
BODY MASS INDEX: 20.4 KG/M2 | SYSTOLIC BLOOD PRESSURE: 111 MMHG | HEIGHT: 67 IN | TEMPERATURE: 97 F | RESPIRATION RATE: 23 BRPM | WEIGHT: 130 LBS | HEART RATE: 80 BPM | DIASTOLIC BLOOD PRESSURE: 84 MMHG | OXYGEN SATURATION: 97 %

## 2021-12-06 PROCEDURE — 99239 HOSP IP/OBS DSCHRG MGMT >30: CPT | Performed by: HOSPITALIST

## 2021-12-06 PROCEDURE — 70450 CT HEAD/BRAIN W/O DYE: CPT | Performed by: NEUROLOGICAL SURGERY

## 2021-12-06 RX ORDER — POLYETHYLENE GLYCOL 3350 17 G/17G
17 POWDER, FOR SOLUTION ORAL DAILY
Qty: 510 G | Refills: 0 | Status: SHIPPED | OUTPATIENT
Start: 2021-12-06 | End: 2022-01-05

## 2021-12-06 RX ORDER — HYDROCODONE BITARTRATE AND ACETAMINOPHEN 5; 325 MG/1; MG/1
1 TABLET ORAL EVERY 6 HOURS PRN
Qty: 15 TABLET | Refills: 0 | Status: SHIPPED | OUTPATIENT
Start: 2021-12-06 | End: 2021-12-14

## 2021-12-06 NOTE — CONSULTS
[place] [facility] - [facility]    Neurosurgery Consultation    Darryl Ojcharmaine Patient Status:  Inpatient    1931 MRN [id number]   Location [facility] 2W/SW Attending Zenon Carrasquillo MD   Hosp Day # 2 PCP Mavis Schumacher     Date of Admission: PRN  •  hydrALAzine HCl (APRESOLINE) injection 10 mg, 10 mg, Intravenous, Q2H PRN  •  acetaminophen (TYLENOL) tab 650 mg, 650 mg, Oral, Q4H PRN **OR** acetaminophen (Tylenol) rectal suppository 650 mg, 650 mg, Rectal, Q4H PRN  •  docusate sodium (COLACE) c (NO IV)(CPT=70450)    Addendum Date: 12/4/2021    ADDENDUM:  COMPARISON: External Exams, CT BRAIN OR HEAD (67594), 12/01/2021, 4:25 AM.  Comparison is now made to the CT from [facility] 12/1/2021.   The small subdural hematomas in the right subfrontal well-defined acute area of parenchymal infarction or acute intraparenchymal hemorrhage is noted. Recommend short-term follow-up study to assess for any progression of these multifocal subdural hematomas.  4. Moderate chronic appearing deep white matter isc

## 2021-12-06 NOTE — DISCHARGE SUMMARY
[facility]ITAL[facility]  DISCHARGE SUMMARY     Slim Khoury Patient Status:  Inpatient    1931 MRN [id number]   [facility] 6NE-A Attending No att. providers found   Hosp Day # 2 PCP Peace Stark     Date of Admission: 2021  Date tablet  Refills: 0        CONTINUE taking these medications      Instructions Prescription details   metoprolol succinate 25 MG Tb24  Commonly known as: Toprol XL      Take 25 mg by mouth daily.    Refills: 0        STOP taking these medications    Eliquis

## 2021-12-06 NOTE — DISCHARGE SUMMARY
[place] [facility] - [facility]    Discharge Summary    Noris Rothman Patient Status:  Inpatient    1931 MRN [id number]   Location [facility] 2W/SW Attending Cristal Shook MD   [address] Day # 2 PCP Lidia Quispe     Date of Admission: 20 to have multiple intracranial hemorrhages after a fall. Patient states he has been feeling fine. Stating he has been extra tired after his hospitalization.  Patient also complaining of constipation which also has attributed to him feeling increasingly tired med     Constipation  -resolved after enema  -cont bowel regimen    Consultations:   neurosurgery    Procedures: n/a    Complications: n/a    Discharge Condition: Good    Discharge Medications:      Discharge Medications      START taking these medications

## 2021-12-06 NOTE — PHYSICAL THERAPY NOTE
PHYSICAL THERAPY EVALUATION - INPATIENT     Room Number: 229/229-A  Evaluation Date: 12/6/2021  Type of Evaluation: Initial   Physician Order: PT Eval and Treat    Presenting Problem: fall, SDH     Reason for Therapy: Mobility Dysfunction and Discharge Pl chair, all needs in place, ALARM ON, RN aware. Dtr present at nursing station and provided update on pt's therapy evaluation and recommendations.  Discussed pt will likely not be able to drive and should be cleared by neurosurgeon/medical doctor prior to Layout: Two level  Stairs to Enter : 2  Railing: Yes  Stairs to Bedroom: 10  Railing: Yes  Lives With: Spouse  Drives: Yes  Patient Owned Equipment: None  Patient Regularly Uses: None     Prior Level of [place]: IND with ADLs, IADLs without assistive standing up from a chair with arms (e.g., wheelchair, bedside commode, etc.): A Little   Patient Difficulty: Moving from lying on back to sitting on the side of the bed?: A Little   How much help from another person does the patient currently need. ..    Hel Status    Goal #5 Patient to demonstrate independence with home activity/exercise instructions provided to patient in preparation for discharge.    Goal #5   Current Status    Goal #6    Goal #6  Current Status

## 2021-12-06 NOTE — PLAN OF CARE
Patient to be discharge home per MD order. Neuro status unchanged. Wife and daughter at bedside.       Problem: Patient Centered Care  Goal: Patient preferences are identified and integrated in the patient's plan of care  Description: Interventions:  - What anti-seizure medications as ordered  - Monitor neurological status  Outcome: Adequate for Discharge  Goal: Remains free of injury related to seizure activity  Description: INTERVENTIONS:  - Maintain airway, patient safety  and administer oxygen as ordered

## 2021-12-06 NOTE — PLAN OF CARE
Pt exhibits impulsiveness throughout the shift, usage of sitter highly neccessary due to high fall risk.  Pt alert and oriented however displays odd behaviors which include slight incoordination which was present since admission but answers questions approp INTERVENTIONS:  - Maintain airway, patient safety  and administer oxygen as ordered  - Monitor patient for seizure activity, document and report duration and description of seizure to MD/LIP  - If seizure occurs, turn patient to side and suction secretions while performing ADLs such as feeding, grooming, and bathing  - Educate and encourage patient/family in tolerated functional activity level and precautions during self-care    Outcome: Not Progressing

## 2021-12-06 NOTE — PLAN OF CARE
Family reporting impatient behavior and paranoia noted in pt for several days, per daughter hard to say exactly when behavior started changing as pt has \"always been an impatient person\".  Repeat CT ordered for tomorrow morning, RN informed pt of plan of parameters to optimize cerebral perfusion and minimize risk of hemorrhage  - Monitor temperature, glucose, and sodium.  Initiate appropriate interventions as ordered  Outcome: Progressing  Goal: Absence of seizures  Description: INTERVENTIONS  - Monitor for Interventions:  Outcome: Progressing

## 2021-12-06 NOTE — OCCUPATIONAL THERAPY NOTE
OCCUPATIONAL THERAPY EVALUATION - INPATIENT     Room Number: 229/229-A  Evaluation Date: 12/6/2021  Type of Evaluation: Initial       Physician Order: IP Consult to Occupational Therapy  Reason for Therapy: ADL/IADL Dysfunction and Discharge Planning    OC function and increase patient participation, safety, and independence with basic ADL and everyday activities. Tosha Sears      DISCHARGE RECOMMENDATIONS: Home with [facility] THERAPY MEDICAL/SOCIAL HISTORY     Problem List  Principal Pr Goals  Patients self stated goal is: to go home now     Patient will complete functional transfer with SPV   Comment:     Patient will complete toileting with SPV   Comment:     Patient will tolerate standing for 3-5 minutes in prep for adls with SPV    Co

## 2021-12-06 NOTE — PROGRESS NOTES
[facility]    Progress Note    Govind Hollingsworth Patient Status:  Inpatient    1931 MRN [id number]   Location [facility] 2W/SW Attending Vic Hernandez MD   Hosp Day # 2 PCP [facility]       Subjective:   Govind Hollignsworth i 12/05/2021    CO2 29.0 12/05/2021     (H) 12/05/2021    CA 7.7 (L) 12/05/2021    ALB 2.6 (L) 12/05/2021    ALKPHO 90 12/05/2021    BILT 1.7 12/05/2021    TP 5.6 (L) 12/05/2021    AST 13 (L) 12/05/2021    ALT 21 12/05/2021    PTT 33.1 12/04/2021    I CT BRAIN OR HEAD (83065), 12/01/2021, 4:25 AM.  Comparison is now made to the CT from [facility] 12/1/2021. The small subdural hematomas in the right subfrontal subdural space and in the right temporal lobe have increased since previous study.   The Recommend short-term follow-up study to assess for any progression of these multifocal subdural hematomas. 4. Moderate chronic appearing deep white matter ischemic change in the periventricular white matter bilaterally.  5. 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  -patient is known to be impatient, which worsened since the intracranial hemorrhage, this am discussed

## 2021-12-06 NOTE — PROGRESS NOTES
Patient discharged home with wife per MD orders. All discharge and follow up information given to wife and daughter at bedside. Home health care setup confirmed with . All medications reviewed.  Neuro status unchanged from this morning, All ques

## 2021-12-07 NOTE — TELEPHONE ENCOUNTER
Spoke to patient wife Han Velez to schedule appointment with Dr. Isaías Richard but she declined the appointment at this time and stated she will call us back if they changed their mind.

## 2021-12-07 NOTE — PAYOR COMM NOTE
--------------  ADMISSION REVIEW     Payor: Stephanie Gleason  Subscriber #:  [id number]  Authorization Number: [id number]    Admit date: 12/4/21  Admit time: 11:03 PM       REVIEW DOCUMENTATION:     ED Provider Notes      ED Provider Notes signed by John Wyatt Occasional     Drug use: Never             Review of Systems    Positive for stated complaint: head injury  Other systems are as noted in HPI. Constitutional and vital signs reviewed. All other systems reviewed and negative except as noted above. function is intact. No abnormal muscle tone, seizure activity or pronator drift. Coordination: Coordination normal.      Comments: Pt alert to voice, participates in neuro exam, appears tired.  LLE movement less brisk than RLE but still 5/5, pt states MDM      Pulse Ox: 98%, Normal, RA    Cardiac Monitor: Pulse Readings from Last 1 Encounters:  12/04/21 : 67  , atrial fibrillation, abnormal for rhythm, normal for rate     Radiology findings: CT STROKE BRAIN (NO IV)(CPT=70450)    Addendum Date: midline structures. 2. In addition, there is a moderate slightly larger chronic appearing subdural hematoma at the high right occipital parietal lobe where it measures about 5.9 mm in transverse dimension.   3. No well-defined acute area of parenchymal infa his neurologic status. Pt also states \"since I came here yesterday\" and although previously he was oriented x3, unclear if he can make that decision. He seems more alert otherwise. Pt agreed to be admitted.      D/w Dr Gerri Kessler (neurosurgery) - will consult hospitalization. Patient also complaining of constipation which also has attributed to him feeling increasingly tired. Patient otherwise denies any current complaints.  Patient denies chest pain, shortness of breath, abdominal pain, nausea vomiting fevers o 99%   BMI 20.36 kg/m²      GENERAL: Thin male. Awake and alert, in no acute distress. HEENT: Normocephalic. Extraocular muscles were intact. PERRL. NECK:  Supple. Trach midline  CHEST:  Symmetrical movement on inspiration  HEART:  S1 and S2 heard.   RRR approximately 5 14 p.m. on  12/4/2021.      Dictated by (CST): Jack Back MD on 12/04/2021 at 4:54 PM     Finalized by (CST): Jack Back MD on 12/04/2021 at 5:18 PM            Data:  Recent Labs   Lab 11/29/21 1935 11/29/21 1935 11/30/21  1338 12/ providers, examining patient, obtaining history, reviewing previous medical records, going over test results/imaging and discussing plan of care. All questions answered to best of my ability.     **Certification      PHYSICIAN Certification of Need for Inpa 12/05/21 2300 — 101 26 111/86 95 % — None (Room air) — MT    12/05/21 2200 — 92 18 106/87 98 % — None (Room air) — MT    12/05/21 2100 — 84 19 123/94 99 % — None (Room air) — MT    12/05/21 2000 98.4 °F (36.9 °C) 127 35 130/67 98 % — None (Room air) — MT 12/05/2021     K 4.0 12/05/2021      12/05/2021     CO2 29.0 12/05/2021      (H) 12/05/2021     CA 7.7 (L) 12/05/2021     ALB 2.6 (L) 12/05/2021     ALKPHO 90 12/05/2021     BILT 1.7 12/05/2021     TP 5.6 (L) 12/05/2021     AST 13 (L) 12/05/20 134/103 (!) 116/93   BP Location: Right arm Right arm   Right arm   Pulse: 96 70 97 100   Resp: 19 23 (!) 30 12   Temp:       97.8 °F (36.6 °C)   TempSrc:       Temporal   SpO2: 96% 98% 98% 100%   Weight:           Height:                CT BRAIN OR HEAD ( general  PT/OT: consulted  DVT ppx: SCD  Code status: Full  Dispo: per clinical course    12/6 NEUROSURGERY CONSULT NOTE  Date of Consult:  12/6/2021     Reason for Consultation:  tSDH     History of Present Illness:  Erikaia Genaro is a a(n) 80 year o

## 2021-12-07 NOTE — PAYOR COMM NOTE
--------------  DISCHARGE REVIEW    Payor: Kazakh Olive  Subscriber #:  [id number]  Authorization Number: [id number]    Admit date: 12/4/21  Admit time:  11:03 PM  Discharge Date: 12/6/2021 12:30 PM     Admitting Physician: Ameya Romeo MD  Attending Problem List:     Azotemia     Hyperglycemia     Epidural hemorrhage with loss of consciousness (HCC)     Epidural hemorrhage with loss of consciousness, initial encounter ([facility] [address]     SDH (subdural hematoma) (HCC)     SAH (subarachnoid hemorrhage) ([facility] [address] expressing concerns about patient's recent diagnosis of intracranial hemorrhages and discharge.     Hospital Course:   Pratima Landis is B 67 year oldmale with hx significant for recent intracranial hemorrahge, who p/w increased lethargy from home.     Anais Friend CONTINUE taking these medications      Instructions Prescription details   levETIRAcetam 250 MG Tabs  Commonly known as: KEPPRA      Take 1 tablet (250 mg total) by mouth 2 (two) times daily.    Quantity: 14 tablet  Refills: 0     metoprolol succinate 25

## 2021-12-13 ENCOUNTER — HOSPITAL ENCOUNTER (OUTPATIENT)
Dept: CT IMAGING | Facility: HOSPITAL | Age: 86
Discharge: HOME | End: 2021-12-13
Attending: NURSE PRACTITIONER
Payer: MEDICARE

## 2021-12-13 DIAGNOSIS — S06.4X9A: ICD-10-CM

## 2021-12-13 DIAGNOSIS — S06.5X9A ACUTE SUBDURAL HEMATOMA (HCC): ICD-10-CM

## 2021-12-13 PROCEDURE — 70450 CT HEAD/BRAIN W/O DYE: CPT | Performed by: NURSE PRACTITIONER

## 2021-12-14 ENCOUNTER — OFFICE VISIT (OUTPATIENT)
Dept: SURGERY | Facility: CLINIC | Age: 86
End: 2021-12-14
Payer: MEDICARE

## 2021-12-14 VITALS
RESPIRATION RATE: 18 BRPM | BODY MASS INDEX: 20.66 KG/M2 | SYSTOLIC BLOOD PRESSURE: 110 MMHG | WEIGHT: 131.63 LBS | HEIGHT: 67 IN | HEART RATE: 80 BPM | DIASTOLIC BLOOD PRESSURE: 70 MMHG

## 2021-12-14 DIAGNOSIS — S06.5X9A SUBDURAL HEMATOMA (HCC): Primary | ICD-10-CM

## 2021-12-14 PROCEDURE — 99214 OFFICE O/P EST MOD 30 MIN: CPT | Performed by: PHYSICIAN ASSISTANT

## 2021-12-14 PROCEDURE — 3074F SYST BP LT 130 MM HG: CPT | Performed by: PHYSICIAN ASSISTANT

## 2021-12-14 PROCEDURE — 3008F BODY MASS INDEX DOCD: CPT | Performed by: PHYSICIAN ASSISTANT

## 2021-12-14 PROCEDURE — 3078F DIAST BP <80 MM HG: CPT | Performed by: PHYSICIAN ASSISTANT

## 2021-12-14 NOTE — PROGRESS NOTES
Patient: Dian Choi  Medical Record Number: [id number]  YOB: 1931  PCP: Adrian Burden    Reason for visit: Hospital follow up, SDH    HISTORY OF CHIEF COMPLAINT:    Dian Choi is a very pleasant 80year old male who presents today for: [facility] CARDIAC PACEMAKER PLACEMENT        No family history on file.    Social History    Socioeconomic History      Marital status:     Tobacco Use      Smoking status: Former Smoker      Smokeless tobacco: Never Used    Vaping Use      Vaping Use: Never u (CPT=70450), 12/04/2021, 4:47 PM. [facility],    CT BRAIN OR HEAD (CPT=70450), 12/06/2021, 4:58 AM.       INDICATIONS: S06.4X9A Epidural hemorrhage w LOC of unsp duration, init (HCC) S06.5X9A Acute subdural hematoma (HCC)       TECHNIQUE: curvilinear high-density foci along the lateral margin of the right temporal lobe, which appear new or more conspicuous since prior; these may relate to interval redistribution of hemorrhagic products.  Trace acute   subdural hematoma along the left tentor I reviewed imaging. I discussed the plan and reviewed imaging with the patient. The patient agrees with the plan, verbalized understanding and is appreciative. All questions were sought out and thoroughly answered to satisfaction.        Total visit ashley

## 2021-12-15 ENCOUNTER — TELEPHONE (OUTPATIENT)
Dept: SURGERY | Facility: CLINIC | Age: 86
End: 2021-12-15

## 2021-12-15 NOTE — TELEPHONE ENCOUNTER
Received CT Brain or Head Radiology Reports DOS 12/01/21 & 12/04/21 from [facility] AND [facility] Radiology Dept. Placed at Nurse bin for .

## 2021-12-16 ENCOUNTER — TELEPHONE (OUTPATIENT)
Dept: SURGERY | Facility: CLINIC | Age: 86
End: 2021-12-16

## 2021-12-16 NOTE — TELEPHONE ENCOUNTER
Patient called. I advised her I discussed the patient's case with Dr. Hal Sifuentes. Per carolina Lindsey for the patient to fly on an airplane.     Patient wife would also like a copy of the patient's most recent CT head sent to his PCP at [phone]    W

## 2021-12-20 NOTE — TELEPHONE ENCOUNTER
Patients spouse is caller, states CT scan was not received and would like it to be re-faxed to [phone]. Patient would like a call to confirm fax has been resent.

## 2022-08-01 ENCOUNTER — HOSPITAL ENCOUNTER (EMERGENCY)
Facility: HOSPITAL | Age: 87
Discharge: HOME | End: 2022-08-01
Attending: EMERGENCY MEDICINE
Payer: MEDICARE

## 2022-08-01 ENCOUNTER — APPOINTMENT (OUTPATIENT)
Dept: GENERAL RADIOLOGY | Facility: HOSPITAL | Age: 87
End: 2022-08-01
Payer: MEDICARE

## 2022-08-01 VITALS
OXYGEN SATURATION: 99 % | WEIGHT: 130 LBS | SYSTOLIC BLOOD PRESSURE: 126 MMHG | HEART RATE: 77 BPM | DIASTOLIC BLOOD PRESSURE: 86 MMHG | TEMPERATURE: 98 F | BODY MASS INDEX: 20 KG/M2 | RESPIRATION RATE: 16 BRPM

## 2022-08-01 DIAGNOSIS — S63.502A SPRAIN OF LEFT WRIST, INITIAL ENCOUNTER: Primary | ICD-10-CM

## 2022-08-01 PROCEDURE — 73110 X-RAY EXAM OF WRIST: CPT

## 2022-08-01 PROCEDURE — 99283 EMERGENCY DEPT VISIT LOW MDM: CPT
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
## 2023-08-09 ENCOUNTER — HOSPITAL ENCOUNTER (OUTPATIENT)
Age: 88
Discharge: HOME | End: 2023-08-09
Attending: EMERGENCY MEDICINE
Payer: MEDICARE

## 2023-08-09 ENCOUNTER — APPOINTMENT (OUTPATIENT)
Dept: GENERAL RADIOLOGY | Age: 88
End: 2023-08-09
Attending: EMERGENCY MEDICINE
Payer: MEDICARE

## 2023-08-09 ENCOUNTER — APPOINTMENT (OUTPATIENT)
Dept: CT IMAGING | Age: 88
End: 2023-08-09
Attending: EMERGENCY MEDICINE
Payer: MEDICARE

## 2023-08-09 VITALS
DIASTOLIC BLOOD PRESSURE: 86 MMHG | HEART RATE: 82 BPM | OXYGEN SATURATION: 100 % | RESPIRATION RATE: 20 BRPM | SYSTOLIC BLOOD PRESSURE: 155 MMHG | TEMPERATURE: 97 F

## 2023-08-09 DIAGNOSIS — S42.034A CLOSED NONDISPLACED FRACTURE OF ACROMIAL END OF RIGHT CLAVICLE, INITIAL ENCOUNTER: Primary | ICD-10-CM

## 2023-08-09 DIAGNOSIS — I48.91 ATRIAL FIBRILLATION WITH CONTROLLED VENTRICULAR RESPONSE (HCC): ICD-10-CM

## 2023-08-09 LAB
#MXD IC: 0.6 X10ˆ3/UL (ref 0.1–1)
BUN BLD-MCNC: 19 MG/DL (ref 7–18)
CHLORIDE BLD-SCNC: 103 MMOL/L (ref 98–112)
CO2 BLD-SCNC: 27 MMOL/L (ref 21–32)
CREAT BLD-MCNC: 0.8 MG/DL
EGFRCR SERPLBLD CKD-EPI 2021: 84 ML/MIN/1.73M2 (ref 60–?)
GLUCOSE BLD-MCNC: 97 MG/DL (ref 70–99)
HCT VFR BLD AUTO: 41.7 %
HCT VFR BLD CALC: 47 %
HGB BLD-MCNC: 13.7 G/DL
ISTAT IONIZED CALCIUM FOR CHEM 8: 1.24 MMOL/L (ref 1.12–1.32)
LYMPHOCYTES # BLD AUTO: 1.5 X10ˆ3/UL (ref 1–4)
LYMPHOCYTES NFR BLD AUTO: 17.1 %
MCH RBC QN AUTO: 32.6 PG (ref 26–34)
MCHC RBC AUTO-ENTMCNC: 32.9 G/DL (ref 31–37)
MCV RBC AUTO: 99.3 FL (ref 80–100)
MIXED CELL %: 6.8 %
NEUTROPHILS # BLD AUTO: 6.7 X10ˆ3/UL (ref 1.5–7.7)
NEUTROPHILS NFR BLD AUTO: 76.1 %
PLATELET # BLD AUTO: 175 X10ˆ3/UL (ref 150–450)
POTASSIUM BLD-SCNC: 4.5 MMOL/L (ref 3.6–5.1)
Q-T INTERVAL: 394 MS
QRS DURATION: 86 MS
QTC CALCULATION (BEZET): 428 MS
R AXIS: 42 DEGREES
RBC # BLD AUTO: 4.2 X10ˆ6/UL
SODIUM BLD-SCNC: 140 MMOL/L (ref 136–145)
T AXIS: -23 DEGREES
TROPONIN I BLD-MCNC: <0.02 NG/ML
VENTRICULAR RATE: 71 BPM
WBC # BLD AUTO: 8.8 X10ˆ3/UL (ref 4–11)

## 2023-08-09 PROCEDURE — 73060 X-RAY EXAM OF HUMERUS: CPT | Performed by: EMERGENCY MEDICINE

## 2023-08-09 PROCEDURE — 99215 OFFICE O/P EST HI 40 MIN: CPT

## 2023-08-09 PROCEDURE — 93005 ELECTROCARDIOGRAM TRACING: CPT

## 2023-08-09 PROCEDURE — 36415 COLL VENOUS BLD VENIPUNCTURE: CPT

## 2023-08-09 PROCEDURE — 84484 ASSAY OF TROPONIN QUANT: CPT

## 2023-08-09 PROCEDURE — 80047 BASIC METABLC PNL IONIZED CA: CPT

## 2023-08-09 PROCEDURE — 73000 X-RAY EXAM OF COLLAR BONE: CPT | Performed by: EMERGENCY MEDICINE

## 2023-08-09 PROCEDURE — 70450 CT HEAD/BRAIN W/O DYE: CPT | Performed by: EMERGENCY MEDICINE

## 2023-08-09 PROCEDURE — 85025 COMPLETE CBC W/AUTO DIFF WBC: CPT | Performed by: EMERGENCY MEDICINE

## 2023-08-09 PROCEDURE — 93010 ELECTROCARDIOGRAM REPORT: CPT

## 2023-08-09 NOTE — ED INITIAL ASSESSMENT (HPI)
Unwitnessed fall from stairs around 9 am today, c/o r shoulder pain, family denies loc, pt on eliquis, small bruise to top of shoulder noted, no bumps or open wound to head

## 2025-06-18 ENCOUNTER — APPOINTMENT (OUTPATIENT)
Dept: CT IMAGING | Facility: HOSPITAL | Age: OVER 89
End: 2025-06-18
Attending: EMERGENCY MEDICINE
Payer: MEDICARE

## 2025-06-18 ENCOUNTER — APPOINTMENT (OUTPATIENT)
Dept: GENERAL RADIOLOGY | Facility: HOSPITAL | Age: OVER 89
End: 2025-06-18
Attending: EMERGENCY MEDICINE
Payer: MEDICARE

## 2025-06-18 ENCOUNTER — HOSPITAL ENCOUNTER (EMERGENCY)
Facility: HOSPITAL | Age: OVER 89
Discharge: HOME | End: 2025-06-18
Attending: EMERGENCY MEDICINE
Payer: MEDICARE

## 2025-06-18 VITALS
DIASTOLIC BLOOD PRESSURE: 77 MMHG | HEIGHT: 70 IN | WEIGHT: 130.06 LBS | TEMPERATURE: 99 F | BODY MASS INDEX: 18.62 KG/M2 | SYSTOLIC BLOOD PRESSURE: 117 MMHG | RESPIRATION RATE: 16 BRPM | OXYGEN SATURATION: 97 % | HEART RATE: 68 BPM

## 2025-06-18 DIAGNOSIS — G30.9 SEVERE ALZHEIMER'S DEMENTIA WITH MOOD DISTURBANCE, UNSPECIFIED TIMING OF DEMENTIA ONSET (HCC): ICD-10-CM

## 2025-06-18 DIAGNOSIS — M79.604 RIGHT LEG PAIN: Primary | ICD-10-CM

## 2025-06-18 DIAGNOSIS — F02.C3 SEVERE ALZHEIMER'S DEMENTIA WITH MOOD DISTURBANCE, UNSPECIFIED TIMING OF DEMENTIA ONSET (HCC): ICD-10-CM

## 2025-06-18 LAB
BASOPHILS # BLD AUTO: 0.03 X10(3) UL (ref 0–0.2)
BASOPHILS NFR BLD AUTO: 0.2 %
DEPRECATED RDW RBC AUTO: 45.4 FL (ref 35.1–46.3)
EOSINOPHIL # BLD AUTO: 0.17 X10(3) UL (ref 0–0.7)
EOSINOPHIL NFR BLD AUTO: 1.4 %
ERYTHROCYTE [DISTWIDTH] IN BLOOD BY AUTOMATED COUNT: 12.5 % (ref 11–15)
GLUCOSE BLDC GLUCOMTR-MCNC: 108 MG/DL (ref 70–99)
HCT VFR BLD AUTO: 38.4 % (ref 39–53)
HGB BLD-MCNC: 12.8 G/DL (ref 13–17.5)
IMM GRANULOCYTES # BLD AUTO: 0.05 X10(3) UL (ref 0–1)
IMM GRANULOCYTES NFR BLD: 0.4 %
LYMPHOCYTES # BLD AUTO: 2.71 X10(3) UL (ref 1–4)
LYMPHOCYTES NFR BLD AUTO: 21.8 %
MCH RBC QN AUTO: 33.2 PG (ref 26–34)
MCHC RBC AUTO-ENTMCNC: 33.3 G/DL (ref 31–37)
MCV RBC AUTO: 99.7 FL (ref 80–100)
MONOCYTES # BLD AUTO: 1.4 X10(3) UL (ref 0.1–1)
MONOCYTES NFR BLD AUTO: 11.3 %
NEUTROPHILS # BLD AUTO: 8.08 X10 (3) UL (ref 1.5–7.7)
NEUTROPHILS # BLD AUTO: 8.08 X10(3) UL (ref 1.5–7.7)
NEUTROPHILS NFR BLD AUTO: 64.9 %
PLATELET # BLD AUTO: 256 10(3)UL (ref 150–450)
Q-T INTERVAL: 376 MS
QRS DURATION: 76 MS
QTC CALCULATION (BEZET): 470 MS
R AXIS: 58 DEGREES
RBC # BLD AUTO: 3.85 X10(6)UL (ref 3.8–5.8)
T AXIS: -18 DEGREES
VENTRICULAR RATE: 94 BPM
WBC # BLD AUTO: 12.4 X10(3) UL (ref 4–11)

## 2025-06-18 PROCEDURE — 99285 EMERGENCY DEPT VISIT HI MDM: CPT

## 2025-06-18 PROCEDURE — 73562 X-RAY EXAM OF KNEE 3: CPT | Performed by: EMERGENCY MEDICINE

## 2025-06-18 PROCEDURE — 73700 CT LOWER EXTREMITY W/O DYE: CPT | Performed by: EMERGENCY MEDICINE

## 2025-06-18 PROCEDURE — 70450 CT HEAD/BRAIN W/O DYE: CPT | Performed by: EMERGENCY MEDICINE

## 2025-06-18 PROCEDURE — 85025 COMPLETE CBC W/AUTO DIFF WBC: CPT | Performed by: EMERGENCY MEDICINE

## 2025-06-18 PROCEDURE — 73610 X-RAY EXAM OF ANKLE: CPT | Performed by: EMERGENCY MEDICINE

## 2025-06-18 PROCEDURE — 99284 EMERGENCY DEPT VISIT MOD MDM: CPT

## 2025-06-18 PROCEDURE — 93005 ELECTROCARDIOGRAM TRACING: CPT

## 2025-06-18 PROCEDURE — 93010 ELECTROCARDIOGRAM REPORT: CPT

## 2025-06-18 PROCEDURE — 80053 COMPREHEN METABOLIC PANEL: CPT | Performed by: EMERGENCY MEDICINE

## 2025-06-18 PROCEDURE — 36415 COLL VENOUS BLD VENIPUNCTURE: CPT

## 2025-06-18 PROCEDURE — 82962 GLUCOSE BLOOD TEST: CPT

## 2025-06-18 PROCEDURE — 72131 CT LUMBAR SPINE W/O DYE: CPT | Performed by: EMERGENCY MEDICINE

## 2025-06-18 NOTE — ED PROVIDER NOTES
Patient Seen in: [facility] Emergency Department    History     Chief Complaint   Patient presents with    Weakness    Syncope       HPI    89-year-old male who presents to the emergency department given that the wife is concerned about his right leg pain, she points at an area at his ankle that appeared to hurt him with pressing. Given dementia, unable to verbalize where his pain is. Wife reports in process of getting palliative/hospice care.   No known falls. No acute worsening of baseline dementia.       History reviewed. Past Medical History[1]    History reviewed. Past Surgical History[2]      Medications :  Prescriptions Prior to Admission[3]     Family History[4]    Smoking Status: Social Hx on file[5]    Constitutional and vital signs reviewed.      Social History and Family History elements reviewed from today, pertinent positives to the presenting problem noted.    Physical Exam     ED Triage Vitals [06/18/25 1631]   BP (!) 140/95   Pulse 86   Resp 16   Temp 99 °F (37.2 °C)   Temp src    SpO2 97 %   O2 Device None (Room air)       All measures to prevent infection transmission during my interaction with the patient were taken. The patient was already wearing a droplet mask on my arrival to the room. Personal protective equipment was worn throughout the duration of the exam.  Handwashing was performed prior to and after the exam.  Stethoscope and any equipment used during my examination was cleaned with super sani-cloth germicidal wipes following the exam.     Physical Exam    General: NAD  Head: Normocephalic and atraumatic.  Mouth/Throat/Ears/Nose: Oropharynx is clear    Eyes: Conjunctivae and EOM are normal.   Neck: Normal range of motion. Supple.   Cardiovascular: Normal rate, regular rhythm, normal heart sounds.  Respiratory/Chest: Clear and equal bilaterally. Exhibits no tenderness.  Gastrointestinal: Soft, non-tender, non-distended. Bowel sounds are normal.   Musculoskeletal:No deformity.   There is minimal right ankle effusion, 2+ DP pulses bilaterally.  No obvious tenderness elicited with range of motion of the right hip and knee passively.  Neurological: not alert, baseline dementia, moving all extremities, moans   Skin: Skin is warm and dry. No pallor.        ED Course        Labs Reviewed   CBC WITH DIFFERENTIAL WITH PLATELET - Abnormal; Notable for the following components:       Result Value    WBC 12.4 (*)     HGB 12.8 (*)     HCT 38.4 (*)     Neutrophil Absolute Prelim 8.08 (*)     Neutrophil Absolute 8.08 (*)     Monocyte Absolute 1.40 (*)     All other components within normal limits   POCT GLUCOSE - Abnormal; Notable for the following components:    POC Glucose  108 (*)     All other components within normal limits   RAINBOW DRAW LAVENDER   RAINBOW DRAW BLUE     EKG    Rate, intervals and axes as noted on EKG Report.  Rate: 94  Rhythm: Atrial fibrillation  Reading: No STEMI           As Interpreted by me    Imaging Results Available and Reviewed while in ED: CT SPINE LUMBAR (CPT=72131)  Result Date: 6/18/2025  CONCLUSION:  1. 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  2. No acute osseous injury of the lumbar spine is detected by CT assessment.   3. A heterogeneous osteopenia throughout the osseous structures.   4. Lesser incidental findings as above.     Dictated by (CST): Boone Angulo MD on 6/18/2025 at 6:27 PM     Finalized by (CST): Boone Angulo MD on 6/18/2025 at 6:31 PM          XR KNEE (3 VIEWS), RIGHT (CPT=73562)  Result Date: 6/18/2025  CONCLUSION:   No acute fracture or dislocation.  Demineralization.  Mild osteoarthritis.  Extensive vascular calcifications.    Dictated by (CST): Marely Sotomaoyr MD on 6/18/2025 at 6:06 PM     Finalized by (CST): Marely Sotomayor MD on 6/18/2025 at 6:07 PM          XR ANKLE (MIN 3 VIEWS), RIGHT (CPT=73610)  Result Date: 6/18/2025  CONCLUSION:   MODERATE STS NO FX/FB   Dictated by (CST): Janny Porter MD on 6/18/2025 at  5:58 PM     Finalized by (CST): Janny Porter MD on 6/18/2025 at 6:00 PM          CT HIP(BONE) RIGHT (CPT=73700)  Result Date: 6/18/2025  CONCLUSION:  No acute fracture. Mild DJD.   Dictated by (CST): Janny Porter MD on 6/18/2025 at 5:45 PM     Finalized by (CST): Janny Porter MD on 6/18/2025 at 5:47 PM          CT BRAIN OR HEAD (CPT=70450)  Result Date: 6/18/2025  CONCLUSION:  Chronic changes. No acute process.   Dictated by (CST): Janny Porter MD on 6/18/2025 at 5:43 PM     Finalized by (CST): Janny Porter MD on 6/18/2025 at 5:44 PM          ED Medications Administered: Medications - No data to display      MDM     Vitals:    06/18/25 1700 06/18/25 1730 06/18/25 1800 06/18/25 1830   BP: 122/70 106/75 113/73 117/77   Pulse: 75  70 68   Resp:       Temp:       SpO2: 97%      Weight:       Height:         *I personally reviewed and interpreted all ED vitals.    Pulse Ox: 97%, Room air, Normal       Medical Decision Making      Differential Diagnosis/ Diagnostic Considerations: Dementia, fracture, contusion    Complicating Factors: The patient already has dementia to contribute to the complexity of this ED evaluation.    I reviewed prior chart records including office note from June 12, 2025.  Consider admission however CT scans unremarkable for intracranial hemorrhage on my interpretation, x-ray negative for fracture on my interpretation of the ankle.  No acute findings.    Labs are unremarkable for acute findings on my interpretation.    Dc In stable condition.      Social determinants of health: Wife is comfortable with the plan and understands to continue with follow-up plan with palliative care and hospice          Disposition and Plan     Clinical Impression:  1. Right leg pain    2. Severe Alzheimer's dementia with mood disturbance, unspecified timing of dementia onset (HCC)        Disposition:  Discharge    Follow-up:  Janelle Martinez  [address]  [address]  [phone]    Schedule an appointment as soon as possible for a visit in 1 day(s)        Medications Prescribed:  Discharge Medication List as of 6/18/2025  6:43 PM                           [1]   Past Medical History:   Arrhythmia    afib    Atrial fibrillation (HCC)    Epidural hemorrhage (HCC)    Essential hypertension    Pacemaker    Subdural hematoma (HCC)   [2]   Past Surgical History:  Procedure Laterality Date    Cardiac pacemaker placement     [3] (Not in a hospital admission)   [4] No family history on file.  [5]   Social History  Socioeconomic History    Marital status:    Tobacco Use    Smoking status: Former    Smokeless tobacco: Never   Vaping Use    Vaping status: Never Used   Substance and Sexual Activity    Alcohol use: Yes     Comment: Occasional     Drug use: Never   Other Topics Concern    Caffeine Concern Yes     Comment: 1 cup of coffee daily    Exercise No

## 2025-06-19 NOTE — ED QUICK NOTES
Patient safe to DC home per MD.  DC teaching done, instructions reviewed with wife,  including when and how to follow up with healthcare providers and when to seek emergency care. Wife verbalizes understanding. Peripheral IV discontinued. Patient assisted into wheelchair and wheeled to exit.